# Patient Record
Sex: FEMALE | Race: BLACK OR AFRICAN AMERICAN | Employment: UNEMPLOYED | URBAN - METROPOLITAN AREA
[De-identification: names, ages, dates, MRNs, and addresses within clinical notes are randomized per-mention and may not be internally consistent; named-entity substitution may affect disease eponyms.]

---

## 2023-12-07 PROBLEM — J06.9 UPPER RESPIRATORY INFECTION: Status: ACTIVE | Noted: 2023-12-07

## 2023-12-07 PROBLEM — H61.21 IMPACTED CERUMEN OF RIGHT EAR: Status: ACTIVE | Noted: 2023-12-07

## 2023-12-07 PROBLEM — H69.92 EUSTACHIAN TUBE DYSFUNCTION, LEFT: Status: ACTIVE | Noted: 2023-12-07

## 2023-12-07 PROBLEM — J34.89 NASAL MASS: Status: ACTIVE | Noted: 2023-12-07

## 2023-12-07 PROBLEM — H92.10 EAR DRAINAGE: Status: ACTIVE | Noted: 2023-12-07

## 2023-12-07 PROBLEM — H90.12 CONDUCTIVE HEARING LOSS OF LEFT EAR WITH UNRESTRICTED HEARING OF RIGHT EAR: Status: ACTIVE | Noted: 2023-12-07

## 2023-12-11 ENCOUNTER — OFFICE VISIT (OUTPATIENT)
Dept: OTOLARYNGOLOGY | Facility: CLINIC | Age: 23
End: 2023-12-11
Payer: COMMERCIAL

## 2023-12-11 VITALS — BODY MASS INDEX: 49.2 KG/M2 | WEIGHT: 269 LBS

## 2023-12-11 DIAGNOSIS — H90.12 CONDUCTIVE HEARING LOSS OF LEFT EAR WITH UNRESTRICTED HEARING OF RIGHT EAR: ICD-10-CM

## 2023-12-11 DIAGNOSIS — H72.92 PERFORATION OF LEFT TYMPANIC MEMBRANE: Primary | ICD-10-CM

## 2023-12-11 DIAGNOSIS — H60.62 CHRONIC OTITIS EXTERNA OF LEFT EAR, UNSPECIFIED TYPE: ICD-10-CM

## 2023-12-11 PROCEDURE — 99204 OFFICE O/P NEW MOD 45 MIN: CPT | Performed by: GENERAL PRACTICE

## 2023-12-11 PROCEDURE — 1036F TOBACCO NON-USER: CPT | Performed by: GENERAL PRACTICE

## 2023-12-11 PROCEDURE — 87070 CULTURE OTHR SPECIMN AEROBIC: CPT

## 2023-12-11 PROCEDURE — 69210 REMOVE IMPACTED EAR WAX UNI: CPT | Performed by: GENERAL PRACTICE

## 2023-12-11 PROCEDURE — 87075 CULTR BACTERIA EXCEPT BLOOD: CPT

## 2023-12-11 RX ORDER — DEXTROAMPHETAMINE SACCHARATE, AMPHETAMINE ASPARTATE, DEXTROAMPHETAMINE SULFATE AND AMPHETAMINE SULFATE 5; 5; 5; 5 MG/1; MG/1; MG/1; MG/1
TABLET ORAL
COMMUNITY
Start: 2023-11-26

## 2023-12-11 RX ORDER — TOPIRAMATE 100 MG/1
TABLET, FILM COATED ORAL
COMMUNITY
Start: 2023-09-07

## 2023-12-11 RX ORDER — FOLIC ACID 1 MG/1
TABLET ORAL DAILY
COMMUNITY

## 2023-12-11 RX ORDER — BENZTROPINE MESYLATE 1 MG/1
1 TABLET ORAL 2 TIMES DAILY
COMMUNITY
Start: 2023-10-19

## 2023-12-11 RX ORDER — CLOTRIMAZOLE AND BETAMETHASONE DIPROPIONATE 10; .5 MG/ML; MG/ML
LOTION TOPICAL
COMMUNITY
Start: 2023-08-01

## 2023-12-11 RX ORDER — DOCUSATE SODIUM 100 MG/1
100 CAPSULE, LIQUID FILLED ORAL 2 TIMES DAILY
COMMUNITY
Start: 2023-10-15

## 2023-12-11 RX ORDER — IBUPROFEN 600 MG/1
600 TABLET ORAL EVERY 6 HOURS PRN
COMMUNITY

## 2023-12-11 RX ORDER — ALBUTEROL SULFATE 90 UG/1
AEROSOL, METERED RESPIRATORY (INHALATION)
COMMUNITY

## 2023-12-11 RX ORDER — CLINDAMYCIN PHOSPHATE 10 UG/ML
LOTION TOPICAL
COMMUNITY
Start: 2020-12-14

## 2023-12-11 RX ORDER — TOPIRAMATE 200 MG/1
200 TABLET ORAL 2 TIMES DAILY
COMMUNITY
Start: 2023-10-12

## 2023-12-11 RX ORDER — CLOZAPINE 200 MG/1
TABLET ORAL
COMMUNITY
Start: 2023-11-27

## 2023-12-11 RX ORDER — SULFACETAMIDE SODIUM 100 MG/ML
SOLUTION/ DROPS OPHTHALMIC
Qty: 5 ML | Refills: 1 | Status: SHIPPED | OUTPATIENT
Start: 2023-12-11 | End: 2024-04-26

## 2023-12-11 RX ORDER — ALBUTEROL SULFATE 0.83 MG/ML
SOLUTION RESPIRATORY (INHALATION)
COMMUNITY
Start: 2023-05-29

## 2023-12-11 NOTE — PROGRESS NOTES
Otolaryngology - Head and Neck Surgery Outpatient New Patient Visit Note        Assessment/Plan   Problem List Items Addressed This Visit             ICD-10-CM       ENT    Conductive hearing loss of left ear with unrestricted hearing of right ear H90.12     Other Visit Diagnoses         Codes    Perforation of left tympanic membrane    -  Primary H72.92    Chronic otitis externa, unspecified laterality, unspecified type     H60.60    Relevant Medications    sulfacetamide (Bleph-10) 10 % ophthalmic solution    Other Relevant Orders    Tissue/Wound Culture/Smear          23yoF with left ear hearing aid use for 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    OE on exam today, though not present on prior recent audiogram by report.  Culture taken, will start with sulfacetamide drops as MOP reports significant amount of drops used over time.  Discussed dry ear precautions.   Avoid hearing aid use until otitis clears.   Medically cleared for hearing aid.            Follow up:  -plan for follow up in clinic as needed and in 2-4 weeks    All of the above findings, impressions, treatment planning and follow up plans were discussed with the patient who indicated understanding.  the patient was instructed to contact or return to clinic sooner if symptoms/signs persist or worsen despite the above management.      Otto Trinidad MD  Otolaryngology - Head and Neck Surgery            History Of Present Illness  Lily Garcia is a 23 y.o. female presenting for hearing aid clearance.   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  No plans for TM closure due to a history of cleft palate and underlying ETD.    Mom helps with history.   Some recent concerns for left ear discomfort and drainage.   No inciting illness/trauma.   Long history of recurrent left otitis due to perforation.  Had not been inflamed recently at hearing test.      Audiogram 11/20/23 shows left sided moderate CHL and open type B tymp on the left.   Normal  hearing and type A tymp on the right.              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    Surgical History  She has a past surgical history that includes Other surgical history (09/15/2014) and Other surgical history (02/23/2019).     Social History  She reports that she has never smoked. She has never used smokeless tobacco. No history on file for alcohol use and drug use.    Family History  No family history on file.     Allergies  Acetaminophen, Banana, Calcitonin (salmon), Cherry, Food allergy formula, Milk, Nutritional supplement-fiber, Peas, and Penicillins    Review of Systems  ROS: Pertinent positives as noted in HPI.    - CONSTITUTIONAL: Does not report weight loss, fever or chills.    - HEENT:   Ear: Does not report tinnitus, vertigo,    , otalgia, otorrhea  Nose: Does not report congestion, rhinorrhea, epistaxis, decreased smell  Throat: Does not report pain, dysphagia, odynophagia  Larynx: Does not report hoarseness,  difficulty breathing, pain with speaking (odynophonia)  Neck: Does not report new masses, pain, swelling  Face: Does not report sinus pain, pressure, swelling, numbness, weakness     - RESPIRATORY: Does not report SOB or cough.    - CV: Does not report palpitations or chest pain.     - GI: Does not report abdominal pain, nausea, vomiting or diarrhea.    - : Does not report dysuria or urinary frequency.    - MSK: Does not report myalgia or joint pain.    - SKIN: Does not report rash or pruritus.    - NEUROLOGICAL: Does not report headache or syncope.    - PSYCHIATRIC: Does not report recent changes in mood. Does not report anxiety or depression.         Physical Exam:     GENERAL:   Alert & Oriented to  person, place and time;  Well developed and well nourished.   Limited historian, somewhat difficult to understand speech.  Relatively cooperative with examination.     HEAD:   Normocephalic, atraumatic. No sinus tenderness to palpation. Normal parotid bilaterally. Normal facial strength.     NEUROLOGIC:   Cranial nerves II-XII grossly intact, gait WNL. Normal mood and affect.    EYES:   Extraocular movements intact. Pupils equal, round, reactive to light and accommodation. No nystagmus, no ptosis. no scleral injection.    EAR:   Normal auricle. No discomfort or TTP with manipulation.   Handheld otoscopic exam showed normal external auditory canals bilaterally.  Purulent debris on the left cultured and partially debrided with suction (limited patient tolerance)  Right tympanic membrane clear and mobile without evidence of perforation, retraction or middle ear effusion.   Left tympanic membrane clear and mobile without evidence of perforation, retraction or middle ear effusion.     NOSE:   No external deformity. No external nasal lesions, lacerations, or scars. Nasal tip symmetrical with normal nasal valves.   Nasal cavity with essentially midline septum, normal mucosa and turbinates. No lesions, masses, purulence or polyps.     OC/OP:   Mucous membranes moist, no masses, lesions or exudates.   Normal tongue, floor of mouth, teeth, gums, lips. Normal posterior pharyngeal wall.    Normal tonsils without erythema, exudate or obvious calculi     NECK:   No neck masses or thyroid enlargement. Trachea midline. No tenderness to palpation    LYMPHATIC:   No cervical lymphadenopathy.     RESPIRATORY:   Symmetric chest elevation & no retractions. No significant hoarseness. No increased work of breathing.    CV:   No clubbing or cyanosis. No obvious edema    Skin:   No facial rashes, vesicles or lesions.     Extremities:   No gross abnormalities      Clinic Procedure    Binocular microscopy exam  Indication: tympanic  membrane(s) could not be visualized adequately with handheld otoscopy.   Location:  bilateral ears  Visualization Instrument: A microscope was used to visualize through a speculum placed in the ear canal(s) to visualize the ear canal, tympanic membranes and to assist in assessment and removal of debris.  Findings:  see physical exam documentation  Patient Status: The patient tolerated the procedure well.   Complications: There were no complications.     Information review:  External sources (notes, imaging, lab results) listed below personally reviewed to aid in medical decision making process.  -  -  -

## 2023-12-13 LAB
BACTERIA SPEC CULT: ABNORMAL
GRAM STN SPEC: ABNORMAL
GRAM STN SPEC: ABNORMAL

## 2024-04-26 DIAGNOSIS — H60.62 CHRONIC OTITIS EXTERNA OF LEFT EAR, UNSPECIFIED TYPE: ICD-10-CM

## 2024-04-26 RX ORDER — SULFACETAMIDE SODIUM 100 MG/ML
SOLUTION/ DROPS OPHTHALMIC
Qty: 15 ML | Refills: 1 | Status: SHIPPED | OUTPATIENT
Start: 2024-04-26

## 2024-09-10 ENCOUNTER — OFFICE VISIT (OUTPATIENT)
Dept: OBSTETRICS AND GYNECOLOGY | Facility: CLINIC | Age: 24
End: 2024-09-10
Payer: COMMERCIAL

## 2024-09-10 VITALS
DIASTOLIC BLOOD PRESSURE: 71 MMHG | SYSTOLIC BLOOD PRESSURE: 100 MMHG | BODY MASS INDEX: 52.44 KG/M2 | HEIGHT: 62 IN | WEIGHT: 285 LBS

## 2024-09-10 DIAGNOSIS — N92.0 MENORRHAGIA WITH REGULAR CYCLE: Primary | ICD-10-CM

## 2024-09-10 DIAGNOSIS — R30.0 DYSURIA: ICD-10-CM

## 2024-09-10 LAB
POC APPEARANCE, URINE: CLEAR
POC BILIRUBIN, URINE: NEGATIVE
POC BLOOD, URINE: ABNORMAL
POC COLOR, URINE: YELLOW
POC GLUCOSE, URINE: NEGATIVE MG/DL
POC KETONES, URINE: NEGATIVE MG/DL
POC LEUKOCYTES, URINE: NEGATIVE
POC NITRITE,URINE: NEGATIVE
POC PH, URINE: 6 PH
POC PROTEIN, URINE: NEGATIVE MG/DL
POC SPECIFIC GRAVITY, URINE: >=1.03
POC UROBILINOGEN, URINE: 1 EU/DL

## 2024-09-10 PROCEDURE — 99395 PREV VISIT EST AGE 18-39: CPT | Performed by: NURSE PRACTITIONER

## 2024-09-10 PROCEDURE — 87086 URINE CULTURE/COLONY COUNT: CPT | Performed by: NURSE PRACTITIONER

## 2024-09-10 PROCEDURE — 3008F BODY MASS INDEX DOCD: CPT | Performed by: NURSE PRACTITIONER

## 2024-09-10 PROCEDURE — 81003 URINALYSIS AUTO W/O SCOPE: CPT | Mod: QW | Performed by: NURSE PRACTITIONER

## 2024-09-10 RX ORDER — LEVONORGESTREL/ETHIN.ESTRADIOL 0.1-0.02MG
1 TABLET ORAL DAILY
Qty: 28 TABLET | Refills: 12 | Status: SHIPPED | OUTPATIENT
Start: 2024-09-10 | End: 2025-09-10

## 2024-09-10 ASSESSMENT — ENCOUNTER SYMPTOMS
PSYCHIATRIC NEGATIVE: 1
EYES NEGATIVE: 1
NEUROLOGICAL NEGATIVE: 1
CARDIOVASCULAR NEGATIVE: 1
CONSTITUTIONAL NEGATIVE: 1
GASTROINTESTINAL NEGATIVE: 1
HEMATOLOGIC/LYMPHATIC NEGATIVE: 1
RESPIRATORY NEGATIVE: 1
ENDOCRINE NEGATIVE: 1
ALLERGIC/IMMUNOLOGIC NEGATIVE: 1
MUSCULOSKELETAL NEGATIVE: 1
DYSURIA: 1

## 2024-09-10 ASSESSMENT — PAIN SCALES - GENERAL: PAINLEVEL: 0-NO PAIN

## 2024-09-10 NOTE — PROGRESS NOTES
"Subjective   La Danny Garcia is a 24 y.o. female with her mother who is here for a routine exam. Periods are regular every 28-30 days, lasting 7-10 days. Dysmenorrhea:none. . No intermenstrual bleeding, spotting, or discharge.    Current contraception: abstinence  History of abnormal Pap smear: no  Family history of uterine or ovarian cancer: no  Regular self breast exam:     History of abnormal mammogram: no  Family history of breast cancer: no  History of abnormal lipids: no  Menstrual History:  OB History    No obstetric history on file.        Menarche age:   [id number]       QI Delgado has complaints of heavy menstrual bleeding and dysuria.  Her mother mentioned that her last between 7-10 days but each cycle the length of her menstrual period is different and they are very heavy.  She has been finding wearing pads with wings to cause vulvar irritation as well as having dysuria.    Review of Systems   Constitutional: Negative.    HENT: Negative.     Eyes: Negative.    Respiratory: Negative.     Cardiovascular: Negative.    Gastrointestinal: Negative.    Endocrine: Negative.    Genitourinary:  Positive for dysuria and vaginal pain.   Musculoskeletal: Negative.    Skin: Negative.    Allergic/Immunologic: Negative.    Neurological: Negative.    Hematological: Negative.    Psychiatric/Behavioral: Negative.         Objective   /71   Ht 1.575 m (5' 2\")   Wt 129 kg (285 lb)   BMI 52.13 kg/m²     Physical Exam  Constitutional:       General: She is not in acute distress.     Appearance: Normal appearance.   HENT:      Head: Normocephalic and atraumatic.   Neurological:      General: No focal deficit present.      Mental Status: She is alert and oriented to person, place, and time.   Psychiatric:         Mood and Affect: Mood normal.         Behavior: Behavior normal.         Thought Content: Thought content normal.         Judgment: Judgment normal.         Assessment/Plan   Assessment:   24 y.o. " female being assessed for menorrhagia with regular cycle and dysuria. Comorbidities include:    Diagnosis List:   #1 Menorrhagia with regular cycle  #2 Dysuria    Plan:  1. Menorrhagia with regular cycle  - Reviewed treatment options which include OCP, vaginal contraception ring, hormonal IUD, and Depo shot.  Due to her intolerance to a vaginal exam I recommend OCP.  - We discussed the risk benefits of starting Levonorgestrel-ethinyl estrad.    2. Dysuria  - Recommend she use wipes instead of regular toilet tissue to reduce the irritation she is experiencing.  - Discuss the proper use of wipes and the importance of wiping front to back to prevent infections.  - Discussed alternatives to pads with wings such a regular pads or period underwear.  - UA was negative but we will send it off for confirmation that there is no infection.    Follow up in 3 months.    Lopez JEFFERSON, sheron scribing for virtually, and in the presence of BLANE Ventura on 09/10/24 5:24 PM.     BLANE Schroeder

## 2024-09-12 ENCOUNTER — TELEPHONE (OUTPATIENT)
Dept: OBSTETRICS AND GYNECOLOGY | Facility: CLINIC | Age: 24
End: 2024-09-12
Payer: COMMERCIAL

## 2024-09-12 LAB — BACTERIA UR CULT: NORMAL

## 2024-09-12 NOTE — TELEPHONE ENCOUNTER
----- Message from Shae Green sent at 9/12/2024  9:47 AM EDT -----  Can you please call and let her and her mom know that there is no infection. Thank you